# Patient Record
(demographics unavailable — no encounter records)

---

## 2024-10-16 NOTE — DISCUSSION/SUMMARY
[FreeTextEntry1] : 28-year-old para 1 annual GYN, amenorrhea, burning with urination, breast-feeding Pap HPV U dip urine culture Follow-up with thyroid sono result Continue norethindrone OCP until stops breast-feeding

## 2024-10-16 NOTE — HISTORY OF PRESENT ILLNESS
[unknown] : Patient is unsure of the date of her LMP [No] : Patient does not have concerns regarding sex [N] : Patient does not use contraception [Y] : Positive pregnancy history [FreeTextEntry1] : 27y/o P1 LMP- 9/8/24, Pt is here for her annual exam, denies any pelvic pain or abnormal bleeding.  Patient was seen by PCP and found to have positive urine culture and abnormal thyroid result.  She had thyroid sonogram recently waiting for results. Patient did not take UTI medication because she was concerned for medication affecting baby while breast-feeding. She is breast-feeding only once a day due to small amount of milk production. She is currently on progesterone only birth control. She still reports burning with urination [LMPDate] : 9/8/24 [PGxTotal] : 1 [[facility] : [facility] [[facility]] : [facility]

## 2025-06-17 NOTE — DISCUSSION/SUMMARY
[FreeTextEntry1] : 29-year-old P1 pelvic pain, contraceptive consult U dip negative junel 1/20 fe ocp changed from progesterone only OCP pelvic sono-small right ovarian hemorrhagic cyst 29 x 23 x 25 mm

## 2025-06-17 NOTE — HISTORY OF PRESENT ILLNESS
[FreeTextEntry1] : 29-year-old P1 LMP 6/5/2025  patient is here for evaluation lower abdominal pain on and off for 1 month . Reports  recently having acne states would like to change ocp, not  breastfeeding  No pain w urination. no discharge, smell or vaginal itching. no n/v/d/c has topical benzoilperoxide  for pimples given  by pcp  no pain with sex. [TextBox_4] : gynhx none

## 2025-06-17 NOTE — PROCEDURE
[Pelvic Pain] : pelvic pain [Transvaginal Ultrasound] : transvaginal ultrasound [FreeTextEntry4] : Small right ovarian hemorrhagic cyst 29 x 23 x 25 mm normal endometrium small left ovarian cyst 17 x 14 mm